# Patient Record
Sex: FEMALE | Race: OTHER | NOT HISPANIC OR LATINO | URBAN - METROPOLITAN AREA
[De-identification: names, ages, dates, MRNs, and addresses within clinical notes are randomized per-mention and may not be internally consistent; named-entity substitution may affect disease eponyms.]

---

## 2020-07-04 ENCOUNTER — EMERGENCY (EMERGENCY)
Facility: HOSPITAL | Age: 30
LOS: 1 days | Discharge: HOME | End: 2020-07-04
Attending: EMERGENCY MEDICINE | Admitting: STUDENT IN AN ORGANIZED HEALTH CARE EDUCATION/TRAINING PROGRAM
Payer: SELF-PAY

## 2020-07-04 VITALS
RESPIRATION RATE: 16 BRPM | DIASTOLIC BLOOD PRESSURE: 90 MMHG | SYSTOLIC BLOOD PRESSURE: 129 MMHG | OXYGEN SATURATION: 100 % | HEART RATE: 93 BPM | TEMPERATURE: 102 F

## 2020-07-04 LAB
ALBUMIN SERPL ELPH-MCNC: 4.6 G/DL — SIGNIFICANT CHANGE UP (ref 3.3–5)
ALP SERPL-CCNC: 110 U/L — SIGNIFICANT CHANGE UP (ref 40–120)
ALT FLD-CCNC: 33 U/L — SIGNIFICANT CHANGE UP (ref 4–33)
ANION GAP SERPL CALC-SCNC: 14 MMO/L — SIGNIFICANT CHANGE UP (ref 7–14)
APPEARANCE UR: SIGNIFICANT CHANGE UP
AST SERPL-CCNC: 27 U/L — SIGNIFICANT CHANGE UP (ref 4–32)
BACTERIA # UR AUTO: HIGH
BASE EXCESS BLDV CALC-SCNC: -1.7 MMOL/L — SIGNIFICANT CHANGE UP
BASOPHILS # BLD AUTO: 0.01 K/UL — SIGNIFICANT CHANGE UP (ref 0–0.2)
BASOPHILS NFR BLD AUTO: 0.1 % — SIGNIFICANT CHANGE UP (ref 0–2)
BILIRUB SERPL-MCNC: 0.7 MG/DL — SIGNIFICANT CHANGE UP (ref 0.2–1.2)
BILIRUB UR-MCNC: NEGATIVE — SIGNIFICANT CHANGE UP
BLOOD GAS VENOUS - CREATININE: 0.57 MG/DL — SIGNIFICANT CHANGE UP (ref 0.5–1.3)
BLOOD GAS VENOUS - FIO2: 21 — SIGNIFICANT CHANGE UP
BLOOD UR QL VISUAL: SIGNIFICANT CHANGE UP
BUN SERPL-MCNC: 4 MG/DL — LOW (ref 7–23)
CALCIUM SERPL-MCNC: 9.7 MG/DL — SIGNIFICANT CHANGE UP (ref 8.4–10.5)
CHLORIDE BLDV-SCNC: 106 MMOL/L — SIGNIFICANT CHANGE UP (ref 96–108)
CHLORIDE SERPL-SCNC: 99 MMOL/L — SIGNIFICANT CHANGE UP (ref 98–107)
CO2 SERPL-SCNC: 23 MMOL/L — SIGNIFICANT CHANGE UP (ref 22–31)
COLOR SPEC: YELLOW — SIGNIFICANT CHANGE UP
CREAT SERPL-MCNC: 0.54 MG/DL — SIGNIFICANT CHANGE UP (ref 0.5–1.3)
EOSINOPHIL # BLD AUTO: 0.01 K/UL — SIGNIFICANT CHANGE UP (ref 0–0.5)
EOSINOPHIL NFR BLD AUTO: 0.1 % — SIGNIFICANT CHANGE UP (ref 0–6)
GAS PNL BLDV: 137 MMOL/L — SIGNIFICANT CHANGE UP (ref 136–146)
GLUCOSE BLDV-MCNC: 104 MG/DL — HIGH (ref 70–99)
GLUCOSE SERPL-MCNC: 115 MG/DL — HIGH (ref 70–99)
GLUCOSE UR-MCNC: NEGATIVE — SIGNIFICANT CHANGE UP
HCG UR-SCNC: NEGATIVE — SIGNIFICANT CHANGE UP
HCO3 BLDV-SCNC: 22 MMOL/L — SIGNIFICANT CHANGE UP (ref 20–27)
HCT VFR BLD CALC: 40.7 % — SIGNIFICANT CHANGE UP (ref 34.5–45)
HCT VFR BLDV CALC: 39.2 % — SIGNIFICANT CHANGE UP (ref 34.5–45)
HGB BLD-MCNC: 13.4 G/DL — SIGNIFICANT CHANGE UP (ref 11.5–15.5)
HGB BLDV-MCNC: 12.7 G/DL — SIGNIFICANT CHANGE UP (ref 11.5–15.5)
HYALINE CASTS # UR AUTO: SIGNIFICANT CHANGE UP
IMM GRANULOCYTES NFR BLD AUTO: 0.3 % — SIGNIFICANT CHANGE UP (ref 0–1.5)
KETONES UR-MCNC: SIGNIFICANT CHANGE UP
LACTATE BLDV-MCNC: 1.3 MMOL/L — SIGNIFICANT CHANGE UP (ref 0.5–2)
LEUKOCYTE ESTERASE UR-ACNC: SIGNIFICANT CHANGE UP
LYMPHOCYTES # BLD AUTO: 1.79 K/UL — SIGNIFICANT CHANGE UP (ref 1–3.3)
LYMPHOCYTES # BLD AUTO: 12.5 % — LOW (ref 13–44)
MCHC RBC-ENTMCNC: 28.2 PG — SIGNIFICANT CHANGE UP (ref 27–34)
MCHC RBC-ENTMCNC: 32.9 % — SIGNIFICANT CHANGE UP (ref 32–36)
MCV RBC AUTO: 85.5 FL — SIGNIFICANT CHANGE UP (ref 80–100)
MONOCYTES # BLD AUTO: 0.84 K/UL — SIGNIFICANT CHANGE UP (ref 0–0.9)
MONOCYTES NFR BLD AUTO: 5.9 % — SIGNIFICANT CHANGE UP (ref 2–14)
NEUTROPHILS # BLD AUTO: 11.62 K/UL — HIGH (ref 1.8–7.4)
NEUTROPHILS NFR BLD AUTO: 81.1 % — HIGH (ref 43–77)
NITRITE UR-MCNC: NEGATIVE — SIGNIFICANT CHANGE UP
NRBC # FLD: 0 K/UL — SIGNIFICANT CHANGE UP (ref 0–0)
PCO2 BLDV: 44 MMHG — SIGNIFICANT CHANGE UP (ref 41–51)
PH BLDV: 7.34 PH — SIGNIFICANT CHANGE UP (ref 7.32–7.43)
PH UR: 7.5 — SIGNIFICANT CHANGE UP (ref 5–8)
PLATELET # BLD AUTO: 379 K/UL — SIGNIFICANT CHANGE UP (ref 150–400)
PMV BLD: 10 FL — SIGNIFICANT CHANGE UP (ref 7–13)
PO2 BLDV: 41 MMHG — HIGH (ref 35–40)
POTASSIUM BLDV-SCNC: 3.3 MMOL/L — LOW (ref 3.4–4.5)
POTASSIUM SERPL-MCNC: 3.6 MMOL/L — SIGNIFICANT CHANGE UP (ref 3.5–5.3)
POTASSIUM SERPL-SCNC: 3.6 MMOL/L — SIGNIFICANT CHANGE UP (ref 3.5–5.3)
PROT SERPL-MCNC: 8.4 G/DL — HIGH (ref 6–8.3)
PROT UR-MCNC: 30 — SIGNIFICANT CHANGE UP
RBC # BLD: 4.76 M/UL — SIGNIFICANT CHANGE UP (ref 3.8–5.2)
RBC # FLD: 13 % — SIGNIFICANT CHANGE UP (ref 10.3–14.5)
RBC CASTS # UR COMP ASSIST: SIGNIFICANT CHANGE UP (ref 0–?)
SAO2 % BLDV: 70.4 % — SIGNIFICANT CHANGE UP (ref 60–85)
SARS-COV-2 RNA SPEC QL NAA+PROBE: SIGNIFICANT CHANGE UP
SODIUM SERPL-SCNC: 136 MMOL/L — SIGNIFICANT CHANGE UP (ref 135–145)
SP GR SPEC: 1.01 — SIGNIFICANT CHANGE UP (ref 1–1.04)
SP GR UR: 1.01 — SIGNIFICANT CHANGE UP (ref 1–1.04)
SQUAMOUS # UR AUTO: SIGNIFICANT CHANGE UP
UROBILINOGEN FLD QL: NORMAL — SIGNIFICANT CHANGE UP
WBC # BLD: 14.32 K/UL — HIGH (ref 3.8–10.5)
WBC # FLD AUTO: 14.32 K/UL — HIGH (ref 3.8–10.5)
WBC UR QL: SIGNIFICANT CHANGE UP (ref 0–?)

## 2020-07-04 PROCEDURE — 99218: CPT

## 2020-07-04 PROCEDURE — 70487 CT MAXILLOFACIAL W/DYE: CPT | Mod: 26

## 2020-07-04 RX ORDER — SODIUM CHLORIDE 9 MG/ML
1000 INJECTION INTRAMUSCULAR; INTRAVENOUS; SUBCUTANEOUS ONCE
Refills: 0 | Status: COMPLETED | OUTPATIENT
Start: 2020-07-04 | End: 2020-07-04

## 2020-07-04 RX ORDER — KETOROLAC TROMETHAMINE 30 MG/ML
30 SYRINGE (ML) INJECTION EVERY 6 HOURS
Refills: 0 | Status: DISCONTINUED | OUTPATIENT
Start: 2020-07-04 | End: 2020-07-05

## 2020-07-04 RX ORDER — ACETAMINOPHEN 500 MG
975 TABLET ORAL ONCE
Refills: 0 | Status: COMPLETED | OUTPATIENT
Start: 2020-07-04 | End: 2020-07-04

## 2020-07-04 RX ORDER — ACETAMINOPHEN 500 MG
650 TABLET ORAL EVERY 6 HOURS
Refills: 0 | Status: DISCONTINUED | OUTPATIENT
Start: 2020-07-04 | End: 2020-07-08

## 2020-07-04 RX ORDER — MORPHINE SULFATE 50 MG/1
4 CAPSULE, EXTENDED RELEASE ORAL ONCE
Refills: 0 | Status: DISCONTINUED | OUTPATIENT
Start: 2020-07-04 | End: 2020-07-04

## 2020-07-04 RX ORDER — AMPICILLIN SODIUM AND SULBACTAM SODIUM 250; 125 MG/ML; MG/ML
3 INJECTION, POWDER, FOR SUSPENSION INTRAMUSCULAR; INTRAVENOUS ONCE
Refills: 0 | Status: COMPLETED | OUTPATIENT
Start: 2020-07-04 | End: 2020-07-04

## 2020-07-04 RX ORDER — AMPICILLIN SODIUM AND SULBACTAM SODIUM 250; 125 MG/ML; MG/ML
3 INJECTION, POWDER, FOR SUSPENSION INTRAMUSCULAR; INTRAVENOUS EVERY 6 HOURS
Refills: 0 | Status: DISCONTINUED | OUTPATIENT
Start: 2020-07-04 | End: 2020-07-08

## 2020-07-04 RX ADMIN — SODIUM CHLORIDE 1000 MILLILITER(S): 9 INJECTION INTRAMUSCULAR; INTRAVENOUS; SUBCUTANEOUS at 18:44

## 2020-07-04 RX ADMIN — MORPHINE SULFATE 4 MILLIGRAM(S): 50 CAPSULE, EXTENDED RELEASE ORAL at 15:00

## 2020-07-04 RX ADMIN — Medication 975 MILLIGRAM(S): at 14:57

## 2020-07-04 RX ADMIN — SODIUM CHLORIDE 1000 MILLILITER(S): 9 INJECTION INTRAMUSCULAR; INTRAVENOUS; SUBCUTANEOUS at 15:15

## 2020-07-04 RX ADMIN — AMPICILLIN SODIUM AND SULBACTAM SODIUM 200 GRAM(S): 250; 125 INJECTION, POWDER, FOR SUSPENSION INTRAMUSCULAR; INTRAVENOUS at 15:01

## 2020-07-04 RX ADMIN — SODIUM CHLORIDE 1000 MILLILITER(S): 9 INJECTION INTRAMUSCULAR; INTRAVENOUS; SUBCUTANEOUS at 14:57

## 2020-07-04 RX ADMIN — AMPICILLIN SODIUM AND SULBACTAM SODIUM 200 GRAM(S): 250; 125 INJECTION, POWDER, FOR SUSPENSION INTRAMUSCULAR; INTRAVENOUS at 21:24

## 2020-07-04 RX ADMIN — AMPICILLIN SODIUM AND SULBACTAM SODIUM 3 GRAM(S): 250; 125 INJECTION, POWDER, FOR SUSPENSION INTRAMUSCULAR; INTRAVENOUS at 15:31

## 2020-07-04 RX ADMIN — Medication 30 MILLIGRAM(S): at 21:20

## 2020-07-04 NOTE — ED ADULT TRIAGE NOTE - CHIEF COMPLAINT QUOTE
Friend states she had a root canal 4 years ago and 2 days ago developed pain in same area. has been taking Motrin Q4hr. c.o. pain 10/10. Developed swelling to lt side of face last night. +swelling noted from Lt eye to chin. +fever at triage. denies difficulty breathing and swallowing.

## 2020-07-04 NOTE — ED PROVIDER NOTE - CLINICAL SUMMARY MEDICAL DECISION MAKING FREE TEXT BOX
30 y.o female with no PMhx presented to the ED complaining of having dental pain worsening over the past 2 days. Dental pain likely dental abscess-labs, fluids, medication, antibiotics, ct facial reassess

## 2020-07-04 NOTE — PROGRESS NOTE ADULT - SUBJECTIVE AND OBJECTIVE BOX
CC: 30 year old Sinhala-speaking female presents to ED with left-sided facial swelling.     HPI: 27 year old female presents to ED with dental pain and left-sided facial swelling for two days. Pain has worsened since onset. Pt states mild relief with ibuprofen and Tylenol. Pt reports associated fever and blurred vision in left eye. Denies difficulty breathing, swallowing, nausea, vomiting, diarrhea, abdominal pain, CP, JULIO, headaches, dizziness.     Med HX: Dental abscess  FACIAL SWELLING  RAD CDS    Allergies: No Known Allergies    RX: ampicillin/sulbactam  IVPB 3 Gram(s) IV Intermittent every 6 hours    EOE: (+) left-sided facial swelling, lower left eyelid swelling, obliteration of left nasolabial fold    TMJ (WNL)  Trismus (-)  LAD (-)  Dysphagia (-)    IOE:   Multiple carious broken down permanent teeth (+) swelling. (+) palpation, significant lip swelling  Buccal Mucosa: Vestibular edema from left upper central to left upper canine   Hard/Soft palate (WNL)  Tongue/Floor of Mouth (WNL)    Radiographs: PAN  1) Periapical lesion associated with maxillary left canine  2) Multiple broken down teeth      Additional Radiograph: CT ordered by ED prior to dental on call arrival.    1) Dental disease, multiple regions in foci of periapical lucency surrounding   the teeth in the maxillary alveolus and mandible.     Left periapical 5 x 7 x 4 mm, AP, TR, CC lucency along left maxillary canine   #11 root, thinning buccal bony cortical margin, with subjacent 1.4 x 0.6 x   1.7 cm AP, TR, CC subperiosteal rim-enhancing collection along the left   maxillary alveolus concerning for subperiosteal abscess. Loss of adjacent   soft tissue planes with inflammatory change and stranding of the fat in the   left premaxillary soft tissues and buccal fat pad concerning for left   maxillary cellulitis extending to left premaxillary soft tissues, left   parotid Stensen's duct, left parotid and  spaces, left nasal ala   and left inferior orbital rim with left preseptal periorbital soft tissue   swelling, left platysma which demonstrates thickening and enlargement with   stranding the fat planes extending inferiorly into the left submandibular   region. The subcutaneous soft tissue swelling also extends into the left   premaxillary soft tissues to the left nasal ala and upper lip with loss of   normal intervening fat planes. Marked periapical lucencies noted along the   right maxillary incisor #8. There is dehiscence of the bony margin overlying   the right canine (series 3 image 110).     There is preseptal periorbital infectious and inflammatory change, there is   extension of the posterior molar roots to the floor of the left maxillary   antra with mucosal thickening in the left maxillary sinus, (2:136,   odontogenic sinusitis also not excluded.     Extensive dental disease along the mandible with multiple dental caries and   lucency. Orbital globes are intact. The mandibular condyles are located   without fracture. The temporomandibular joints are intact.     There is enlargement of the nasopharyngeal adenoidal soft tissues and   palatine tonsils likely reactive with multiple small lymph nodes in the   upper cervical chains. Intracranially, there is no acute hemorrhage or   midline shift.     IMPRESSION:     Dental disease with periapical lucency, in mandible and maxilla, including   left maxillary tooth #11 with a subjacent 1.4 x 0.6 x 1.7 cm AP, TR, CC left   subperiosteal abscess with left premaxillary soft tissue swelling and   cellulitis and left maxillary, parotid, submandibular, preseptal and   periorbital regions,, with additional areas of dental disease as detailed   above.      Assessment: Gross caries tooth #11  with associated swelling.      Treatment: Discussed radiographic and clinical findings with patient using the Mong[place]ian- ([id number]). Discussed RBA of recommended treatment. Informed patient of multiple nonrestorable and carious teeth that need to be addressed by outside dentist. Pt understands that she has more than 1 source of pain in her mouth. Obtained verbal consent. Administered 1 carpule of lidocaine 2% 1:100k, 2 carpules 4% septocaine 1:100k epi via local infiltration with changing of needles after each administration. Incised to bone, dissected fascial planes, hemopurulence appreciated. Culture taken.  Irrigated copiously with sterline saline. Penrose drain placed with 1 3-0 silk suture. Hemostasis achieved. POIG. All questions answered.    Recommendations:   1. Per ED, overnight stay on IV Unaysen   2. F/U with outside dentist for comprehensive care  3. F/U in 2-3 business days for drain removal with either outpatient dentist or [facility] dental clinic [phone].  4. If any difficulty breathing/swallowing or fever and swelling occur, return to ED.    Albino Sanchez, DMD #[id number] CC: 30 year old Urdu-speaking female presents to ED with left-sided facial swelling.     HPI: 27 year old female presents to ED with dental pain and left-sided facial swelling for two days. Pain has worsened since onset. Pt states mild relief with ibuprofen and Tylenol. Pt reports associated fever and blurred vision in left eye. Denies difficulty breathing, swallowing, nausea, vomiting, diarrhea, abdominal pain, CP, JULIO, headaches, dizziness.     Med HX: Dental abscess  FACIAL SWELLING  RAD CDS    Allergies: No Known Allergies    RX: ampicillin/sulbactam  IVPB 3 Gram(s) IV Intermittent every 6 hours    EOE: (+) left-sided facial swelling, lower left eyelid swelling, obliteration of left nasolabial fold    TMJ (WNL)  Trismus (-)  LAD (-)  Dysphagia (-)    IOE:   Multiple carious broken down permanent teeth (+) swelling. (+) palpation, significant lip swelling  Buccal Mucosa: Vestibular edema from left upper central to left upper canine   Hard/Soft palate (WNL)  Tongue/Floor of Mouth (WNL)    Radiographs: PAN  1) Periapical lesion associated with maxillary left canine  2) Multiple broken down teeth      Additional Radiograph: CT ordered by ED prior to dental on call arrival.    1) Dental disease, multiple regions in foci of periapical lucency surrounding   the teeth in the maxillary alveolus and mandible.     Left periapical 5 x 7 x 4 mm, AP, TR, CC lucency along left maxillary canine   #11 root, thinning buccal bony cortical margin, with subjacent 1.4 x 0.6 x   1.7 cm AP, TR, CC subperiosteal rim-enhancing collection along the left   maxillary alveolus concerning for subperiosteal abscess. Loss of adjacent   soft tissue planes with inflammatory change and stranding of the fat in the   left premaxillary soft tissues and buccal fat pad concerning for left   maxillary cellulitis extending to left premaxillary soft tissues, left   parotid Stensen's duct, left parotid and  spaces, left nasal ala   and left inferior orbital rim with left preseptal periorbital soft tissue   swelling, left platysma which demonstrates thickening and enlargement with   stranding the fat planes extending inferiorly into the left submandibular   region. The subcutaneous soft tissue swelling also extends into the left   premaxillary soft tissues to the left nasal ala and upper lip with loss of   normal intervening fat planes. Marked periapical lucencies noted along the   right maxillary incisor #8. There is dehiscence of the bony margin overlying   the right canine (series 3 image 110).     There is preseptal periorbital infectious and inflammatory change, there is   extension of the posterior molar roots to the floor of the left maxillary   antra with mucosal thickening in the left maxillary sinus, (2:136,   odontogenic sinusitis also not excluded.     Extensive dental disease along the mandible with multiple dental caries and   lucency. Orbital globes are intact. The mandibular condyles are located   without fracture. The temporomandibular joints are intact.     There is enlargement of the nasopharyngeal adenoidal soft tissues and   palatine tonsils likely reactive with multiple small lymph nodes in the   upper cervical chains. Intracranially, there is no acute hemorrhage or   midline shift.     IMPRESSION:     Dental disease with periapical lucency, in mandible and maxilla, including   left maxillary tooth #11 with a subjacent 1.4 x 0.6 x 1.7 cm AP, TR, CC left   subperiosteal abscess with left premaxillary soft tissue swelling and   cellulitis and left maxillary, parotid, submandibular, preseptal and   periorbital regions,, with additional areas of dental disease as detailed   above.      Assessment: Gross caries tooth #11  with associated swelling.      Treatment: Discussed radiographic and clinical findings with patient using the [place]z- ([id number]). Discussed RBA of recommended treatment. Informed patient of multiple nonrestorable and carious teeth that need to be addressed by outside dentist. Pt understands that she has more than 1 source of pain in her mouth. Obtained verbal consent. Administered 1 carpule of lidocaine 2% 1:100k, 2 carpules 4% septocaine 1:100k epi via local infiltration with changing of needles after each administration. Incised to bone, dissected fascial planes, hemopurulence appreciated. Culture taken.  Irrigated copiously with sterline saline. Penrose drain placed with 1 3-0 silk suture. Hemostasis achieved. POIG. All questions answered.    Recommendations:   1. Per ED, overnight stay on IV Unaysen   2. F/U with outside dentist for comprehensive care  3. F/U in 2-3 business days for drain removal with either outpatient dentist or [facility] dental clinic [phone].  4. If any difficulty breathing/swallowing or fever and swelling occur, return to ED.    Bekah Villagomez DDS #[id number]  Martha Gray DDS #[id number]  Elma Cm DDS #[id number]

## 2020-07-04 NOTE — ED PROVIDER NOTE - TOOTH FINDINGS
PERCUSSION TENDERNESS/tenderness and swelling to left frontal tooth, swelling and tenderness to left side of face concerning for abscess./DENTAL CARIES

## 2020-07-04 NOTE — ED CDU PROVIDER INITIAL DAY NOTE - PHYSICAL EXAMINATION
CONSTITUTIONAL:  Well appearing, awake, alert, oriented to person, place, time/situation and in no apparent distress.  Pt. is objectively comfortable appearing and verbalizing in full, clear, effortless sentences.  ENMT: Left cheek with generalized mild soft tissue swelling / mild erythema. Airway patent.  Nasal mucosa clear.  Moist mucous membranes.  +Dental caries; pt s/p I&D with Penrose drain in place.  Neck supple.  EYES:  Clear OU.  CARDIAC:  Normal rate, regular rhythm.  Heart sounds S1 S2.  No murmurs, gallops, or rubs.  RESPIRATORY:  Breath sounds clear and equal bilaterally.  No wheezes, no rales, no rhonchi.  GASTROINTESTINAL:  Abdomen soft, non-distended, non-tender.  No rebound, no guarding.  MUSCULOSKELETAL:  Range of motion is not limited.  Gait stable.    SKIN:  Skin color unremarkable.  Skin warm, dry, and intact.    PSYCHIATRIC:  Alert and oriented to person/place/time/situation.  Mood and affect WNL.  No apparent risk to self or others.

## 2020-07-04 NOTE — ED ADULT NURSE NOTE - OBJECTIVE STATEMENT
patient  Alert & ox3. patient complains of  swelling to lt side of face last night. +swelling noted from Lt eye to chin. +fever at triage. pt . evaluated by MD. Placed 20g angiocath Lt. AC., labs drawn and sent. IVF NS started as per order. patient will be waiting for results, further evaluation and disposition.  made comfortable.will continue to monitor.

## 2020-07-04 NOTE — ED CDU PROVIDER INITIAL DAY NOTE - INDICATION FOR OBSERVATION
Other/IV antibiotics, supportive care / pain control, general observation care / monitoring; AM labs, Dental team following pt./Therapeutic Intensity

## 2020-07-04 NOTE — ED PROVIDER NOTE - OBJECTIVE STATEMENT
30 y.o female with no PMhx presented to the ED complaining of having dental pain worsening over the past 2 days. Pt states that 2 days prior she woke up and started to have swelling and pain around her tooth. Pt states that since then the pain has gotten persistently worse. pt states that she attempted to take ibuprofen and tylenol however did not have much relief. Pt admits to having associated fever as well. Pt denies having any nausea, vomiting, diarrhea, abdominal pain, CP, JULIO, headaches, dizziness. Pt states that she had a root canal 4 years ago. Pt states that she has been able to drink fluids however unable to eat solids.

## 2020-07-04 NOTE — ED CDU PROVIDER INITIAL DAY NOTE - OBJECTIVE STATEMENT
30 y.o female with no PMhx presented to the ED complaining of having dental pain worsening over the past 2 days. Pt states that 2 days prior she woke up and started to have swelling and pain around her tooth. Pt states that since then the pain has gotten persistently worse. pt states that she attempted to take ibuprofen and tylenol however did not have much relief. Pt admits to having associated fever as well. Pt denies having any nausea, vomiting, diarrhea, abdominal pain, CP, JULIO, headaches, dizziness. Pt states that she had a root canal 4 years ago. Pt states that she has been able to drink fluids however unable to eat solids.    CDU IS[facility] Alexander Note------  31 yo female, no stated PMH, presented to the ED for left facial swelling (cheek/jaw) and dental pain to the left side.  In the ED, WBC 14.32, lactate 1.3, CT maxillofacial performed: "....Dental disease, multiple regions in foci of periapical lucency surrounding the teeth in the maxillary alveolus and mandible.  Left periapical 5 x 7 x 4 mm, AP, TR, CC lucency along left maxillary canine #11 root, thinning  buccal bony cortical margin, with  subjacent 1.4 x 0.6 x 1.7 cm AP, TR, CC subperiosteal rim-enhancing collection along the left maxillary alveolus concerning for subperiosteal abscess. Loss of adjacent soft tissue planes with inflammatory change and stranding of the fat in the left premaxillary soft tissues and buccal fat pad concerning for  left maxillary cellulitis extending to left premaxillary soft tissues,  left parotid Stensen's duct, left parotid and  spaces, left nasal ala and left inferior orbital rim with left preseptal periorbital soft tissue swelling,  left platysma which demonstrates thickening and enlargement with stranding the fat planes extending inferiorly into the left submandibular region. The subcutaneous soft tissue swelling also extends into the left premaxillary soft tissues to the left nasal ala and upper lip with loss of normal intervening fat planes. Marked periapical lucencies noted along the right maxillary incisor #8. There is dehiscence of the bony margin overlying the right canine (series 3 image 110).  There is preseptal periorbital infectious and inflammatory change, there is extension of the posterior molar roots to the floor of the left maxillary antra with mucosal thickening in the left maxillary sinus, (2:136, odontogenic sinusitis also not excluded.  Extensive dental disease along the mandible with multiple  dental caries and lucency. Orbital globes are intact. The mandibular condyles are located without fracture. The temporomandibular joints are intact.   There is enlargement of the nasopharyngeal adenoidal soft tissues and palatine tonsils likely reactive with multiple small lymph nodes in the upper cervical chains. Intracranially, there is no acute hemorrhage or midline shift.".  Dental team was consulted; drainage of dental abscess was performed by team with Penrose drain placed.  Pt was given IV Unasyn in the ED; Dental advised CDU stay for continued IV Unasyn treatment.  Pt. was dispo'd to CDU for plan:  IV antibiotics, supportive care / pain control, general observation care / monitoring; AM labs, Dental team following pt.

## 2020-07-04 NOTE — ED CDU PROVIDER INITIAL DAY NOTE - ATTENDING CONTRIBUTION TO CARE
IV antibiotics, supportive care / pain control, general observation care / monitoring; AM labs, Dental team following pt.

## 2020-07-05 LAB
ANION GAP SERPL CALC-SCNC: 14 MMO/L — SIGNIFICANT CHANGE UP (ref 7–14)
BASOPHILS # BLD AUTO: 0.03 K/UL — SIGNIFICANT CHANGE UP (ref 0–0.2)
BASOPHILS NFR BLD AUTO: 0.2 % — SIGNIFICANT CHANGE UP (ref 0–2)
BUN SERPL-MCNC: 4 MG/DL — LOW (ref 7–23)
CALCIUM SERPL-MCNC: 9 MG/DL — SIGNIFICANT CHANGE UP (ref 8.4–10.5)
CHLORIDE SERPL-SCNC: 104 MMOL/L — SIGNIFICANT CHANGE UP (ref 98–107)
CO2 SERPL-SCNC: 22 MMOL/L — SIGNIFICANT CHANGE UP (ref 22–31)
CREAT SERPL-MCNC: 0.53 MG/DL — SIGNIFICANT CHANGE UP (ref 0.5–1.3)
CULTURE RESULTS: SIGNIFICANT CHANGE UP
EOSINOPHIL # BLD AUTO: 0.09 K/UL — SIGNIFICANT CHANGE UP (ref 0–0.5)
EOSINOPHIL NFR BLD AUTO: 0.7 % — SIGNIFICANT CHANGE UP (ref 0–6)
GLUCOSE SERPL-MCNC: 78 MG/DL — SIGNIFICANT CHANGE UP (ref 70–99)
HBA1C BLD-MCNC: 5.2 % — SIGNIFICANT CHANGE UP (ref 4–5.6)
HCT VFR BLD CALC: 35.4 % — SIGNIFICANT CHANGE UP (ref 34.5–45)
HGB BLD-MCNC: 11.8 G/DL — SIGNIFICANT CHANGE UP (ref 11.5–15.5)
IMM GRANULOCYTES NFR BLD AUTO: 0.4 % — SIGNIFICANT CHANGE UP (ref 0–1.5)
LYMPHOCYTES # BLD AUTO: 2.97 K/UL — SIGNIFICANT CHANGE UP (ref 1–3.3)
LYMPHOCYTES # BLD AUTO: 24.4 % — SIGNIFICANT CHANGE UP (ref 13–44)
MCHC RBC-ENTMCNC: 28.9 PG — SIGNIFICANT CHANGE UP (ref 27–34)
MCHC RBC-ENTMCNC: 33.3 % — SIGNIFICANT CHANGE UP (ref 32–36)
MCV RBC AUTO: 86.8 FL — SIGNIFICANT CHANGE UP (ref 80–100)
MONOCYTES # BLD AUTO: 0.94 K/UL — HIGH (ref 0–0.9)
MONOCYTES NFR BLD AUTO: 7.7 % — SIGNIFICANT CHANGE UP (ref 2–14)
NEUTROPHILS # BLD AUTO: 8.07 K/UL — HIGH (ref 1.8–7.4)
NEUTROPHILS NFR BLD AUTO: 66.6 % — SIGNIFICANT CHANGE UP (ref 43–77)
NRBC # FLD: 0 K/UL — SIGNIFICANT CHANGE UP (ref 0–0)
PLATELET # BLD AUTO: 340 K/UL — SIGNIFICANT CHANGE UP (ref 150–400)
PMV BLD: 10.9 FL — SIGNIFICANT CHANGE UP (ref 7–13)
POTASSIUM SERPL-MCNC: 3.4 MMOL/L — LOW (ref 3.5–5.3)
POTASSIUM SERPL-SCNC: 3.4 MMOL/L — LOW (ref 3.5–5.3)
RBC # BLD: 4.08 M/UL — SIGNIFICANT CHANGE UP (ref 3.8–5.2)
RBC # FLD: 13.3 % — SIGNIFICANT CHANGE UP (ref 10.3–14.5)
SODIUM SERPL-SCNC: 140 MMOL/L — SIGNIFICANT CHANGE UP (ref 135–145)
SPECIMEN SOURCE: SIGNIFICANT CHANGE UP
WBC # BLD: 12.15 K/UL — HIGH (ref 3.8–10.5)
WBC # FLD AUTO: 12.15 K/UL — HIGH (ref 3.8–10.5)

## 2020-07-05 PROCEDURE — 99224: CPT

## 2020-07-05 RX ADMIN — AMPICILLIN SODIUM AND SULBACTAM SODIUM 200 GRAM(S): 250; 125 INJECTION, POWDER, FOR SUSPENSION INTRAMUSCULAR; INTRAVENOUS at 21:46

## 2020-07-05 RX ADMIN — AMPICILLIN SODIUM AND SULBACTAM SODIUM 200 GRAM(S): 250; 125 INJECTION, POWDER, FOR SUSPENSION INTRAMUSCULAR; INTRAVENOUS at 15:22

## 2020-07-05 RX ADMIN — Medication 30 MILLIGRAM(S): at 10:16

## 2020-07-05 RX ADMIN — Medication 650 MILLIGRAM(S): at 17:49

## 2020-07-05 RX ADMIN — AMPICILLIN SODIUM AND SULBACTAM SODIUM 200 GRAM(S): 250; 125 INJECTION, POWDER, FOR SUSPENSION INTRAMUSCULAR; INTRAVENOUS at 03:22

## 2020-07-05 RX ADMIN — Medication 650 MILLIGRAM(S): at 01:23

## 2020-07-05 RX ADMIN — AMPICILLIN SODIUM AND SULBACTAM SODIUM 200 GRAM(S): 250; 125 INJECTION, POWDER, FOR SUSPENSION INTRAMUSCULAR; INTRAVENOUS at 09:16

## 2020-07-05 NOTE — PROGRESS NOTE ADULT - SUBJECTIVE AND OBJECTIVE BOX
Patient is a 30y old  Female who presents with a chief complaint of left side facial swelling (2020 11:17)    HPI:  Patient had I&D on 2020 following admission to the ED. CT ordered by ED prior to dental on call arrival.  Purulence was appreciated upon I&D. Culture was taken.   Patient continues to improve following I/D procedure.     PAST MEDICAL & SURGICAL HISTORY:  No pertinent past medical history  No significant past surgical history      MEDICATIONS  (STANDING):  ampicillin/sulbactam  IVPB 3 Gram(s) IV Intermittent every 6 hours    MEDICATIONS  (PRN):  acetaminophen   Tablet .. 650 milliGRAM(s) Oral every 6 hours PRN Temp greater or equal to 38C (100.4F)  ketorolac   Injectable 30 milliGRAM(s) IV Push every 6 hours PRN mild to moderate pain      Allergies    No Known Allergies    Intolerances        FAMILY HISTORY:  No pertinent family history in first degree relatives      *SOCIAL HISTORY: (guardian or who pt came with), (smoking hx)    Vital Signs Last 24 Hrs  T(C): 37.2 (2020 14:05), Max: 38.3 (2020 21:05)  T(F): 99 (2020 14:05), Max: 101 (2020 21:05)  HR: 97 (2020 14:05) (84 - 103)  BP: 105/71 (2020 14:05) (104/59 - 122/76)  BP(mean): --  RR: 16 (2020 14:05) (16 - 17)  SpO2: 100% (2020 14:05) (100% - 100%)    EOE:  TMJ ( -  ) clicks                    ( -   ) pops                    (  - ) crepitus             Mandible FROM             Facial bones and MOM: grossly intact             ( -  ) trismus             ( -  ) LAD             ( + ) swelling: UL facial swelling, improved since this morning 20. Naso-labial fold now visible.              ( +  ) asymmetry             (  - ) palpation             ( -  ) SOB             ( -  ) dysphagia             ( -  ) LOC    Left eyelid opening improved since 7/5 AM, pt states that she has no trouble with vision. No problems with breathing or swallowing. Pt was able to open mouth more than this morning.     IOE:   secondary dentition, multiple grossly decayed teeth, multiple missing teeth           hard/soft palate:  ( -  ) palatal torus           tongue/FOM WNL           labial/buccal mucosa - edematous, healing mucosa at site of I&D           ( -  ) percussion           ( +  ) palpation - mild sensitivity upon palpation previous ID site #10/11           (  + ) swelling - mild intraoral swelling associated with I&D site as expected    Penrose drain is still intact and in place. Moved drain further into incision with instrument.     LABS:                        11.8   12.15 )-----------( 340      ( 2020 06:05 )             35.4         140  |  104  |  4<L>  ----------------------------<  78  3.4<L>   |  22  |  0.53    Ca    9.0      2020 06:05    TPro  8.4<H>  /  Alb  4.6  /  TBili  0.7  /  DBili  x   /  AST  27  /  ALT  33  /  AlkPhos  110      WBC Count: 12.15 K/uL <H> [3.8 - 10.5] ( @ 06:05)  Platelet Count - Automated: 340 K/uL [150 - 400] ( @ 06:05)  WBC Count: 14.32 K/uL <H> [3.8 - 10.5] ( @ 14:50)  Platelet Count - Automated: 379 K/uL [150 - 400] ( @ 14:50)  Culture Results:   <10,000 CFU/mL Normal Urogenital Lynn ( @ 14:15)    Urinalysis Basic - ( 2020 14:52 )    Color: YELLOW / Appearance: Lt TURBID / S.010 / pH: 7.5  Gluc: NEGATIVE / Ketone: SMALL  / Bili: NEGATIVE / Urobili: NORMAL   Blood: TRACE / Protein: 30 / Nitrite: NEGATIVE   Leuk Esterase: LARGE / RBC: 0-2 / WBC 30-50   Sq Epi: MODERATE / Non Sq Epi: x / Bacteria: MANY    ASSESSMENT: Swelling improved since this morning. Pt able to open up left eye more and is not in any pain currently.   Pt is healing wnl.    PROCEDURE: IOE, EOE, Clinical exam performed. Moved drain further into incision site. Answered all questions via .     RECOMMENDATIONS:  1) F/U with [facility] to remove drain tomorrow 2020. Pt given phone number to clinic to schedule appt to have drain removed during f/u.   2) Antibiotics per ED in hospital, and recommend PO antibiotics upon discharge, otc pain medication.   3) Dental F/U with outpatient dentist for comprehensive dental care.   4) If any difficulty swallowing/breathing, fever occur, page dental.     Vikash Hernandez, QUIA[facility] #[id number]  Vladislav Cortez DDS #[id number]  Shahram Mckeon, SANDRA #[id number] Patient is a 30y old  Female who presents with a chief complaint of left side facial swelling (2020 11:17)    HPI:  Patient had I&D on 2020 following admission to the ED. CT ordered by ED prior to dental on call arrival.  Purulence was appreciated upon I&D. Culture was taken.   Patient continues to improve following I/D procedure.     PAST MEDICAL & SURGICAL HISTORY:  No pertinent past medical history  No significant past surgical history      MEDICATIONS  (STANDING):  ampicillin/sulbactam  IVPB 3 Gram(s) IV Intermittent every 6 hours    MEDICATIONS  (PRN):  acetaminophen   Tablet .. 650 milliGRAM(s) Oral every 6 hours PRN Temp greater or equal to 38C (100.4F)  ketorolac   Injectable 30 milliGRAM(s) IV Push every 6 hours PRN mild to moderate pain      Allergies    No Known Allergies    Intolerances        FAMILY HISTORY:  No pertinent family history in first degree relatives      *SOCIAL HISTORY: (guardian or who pt came with), (smoking hx)    Vital Signs Last 24 Hrs  T(C): 37.2 (2020 14:05), Max: 38.3 (2020 21:05)  T(F): 99 (2020 14:05), Max: 101 (2020 21:05)  HR: 97 (2020 14:05) (84 - 103)  BP: 105/71 (2020 14:05) (104/59 - 122/76)  BP(mean): --  RR: 16 (2020 14:05) (16 - 17)  SpO2: 100% (2020 14:05) (100% - 100%)    EOE:  TMJ ( -  ) clicks                    ( -   ) pops                    (  - ) crepitus             Mandible FROM             Facial bones and MOM: grossly intact             ( -  ) trismus             ( -  ) LAD             ( + ) swelling: UL facial swelling, improved since this morning 20. Naso-labial fold now visible.              ( +  ) asymmetry             (  - ) palpation             ( -  ) SOB             ( -  ) dysphagia             ( -  ) LOC    Left eyelid opening improved since 7/5 AM, pt states that she has no trouble with vision. No problems with breathing or swallowing. Pt was able to open mouth more than this morning.     IOE:   secondary dentition, multiple grossly decayed teeth, multiple missing teeth           hard/soft palate:  ( -  ) palatal torus           tongue/FOM WNL           labial/buccal mucosa - edematous, healing mucosa at site of I&D           ( -  ) percussion           ( +  ) palpation - mild sensitivity upon palpation previous ID site #10/11           (  + ) swelling - mild intraoral swelling associated with I&D site as expected    Penrose drain is still intact and in place. Moved drain further into incision with instrument.     LABS:                        11.8   12.15 )-----------( 340      ( 2020 06:05 )             35.4         140  |  104  |  4<L>  ----------------------------<  78  3.4<L>   |  22  |  0.53    Ca    9.0      2020 06:05    TPro  8.4<H>  /  Alb  4.6  /  TBili  0.7  /  DBili  x   /  AST  27  /  ALT  33  /  AlkPhos  110      WBC Count: 12.15 K/uL <H> [3.8 - 10.5] ( @ 06:05)  Platelet Count - Automated: 340 K/uL [150 - 400] ( @ 06:05)  WBC Count: 14.32 K/uL <H> [3.8 - 10.5] ( @ 14:50)  Platelet Count - Automated: 379 K/uL [150 - 400] ( @ 14:50)  Culture Results:   <10,000 CFU/mL Normal Urogenital Lynn ( @ 14:15)    Urinalysis Basic - ( 2020 14:52 )    Color: YELLOW / Appearance: Lt TURBID / S.010 / pH: 7.5  Gluc: NEGATIVE / Ketone: SMALL  / Bili: NEGATIVE / Urobili: NORMAL   Blood: TRACE / Protein: 30 / Nitrite: NEGATIVE   Leuk Esterase: LARGE / RBC: 0-2 / WBC 30-50   Sq Epi: MODERATE / Non Sq Epi: x / Bacteria: MANY    ASSESSMENT: Swelling improved since this morning. Pt able to open up left eye more and is not in any pain currently.   Pt is healing wnl.    PROCEDURE: IOE, EOE, Clinical exam performed. Moved drain further into incision site. Answered all questions and instructions given via  ( ID #[id number]).     RECOMMENDATIONS:  1) F/U with [facility] to remove drain tomorrow 2020. Pt given phone number to clinic to schedule appt to have drain removed during f/u.   2) Antibiotics per ED in hospital, and recommend PO antibiotics upon discharge, otc pain medication.   3) Dental F/U with outpatient dentist for comprehensive dental care.   4) If any difficulty swallowing/breathing, fever occur, page dental.     Vikash Hernandez, [facility] #[id number]  Vladislav Cortez DDS #[id number]  Shahram Mckeon, SANDRA #[id number]

## 2020-07-05 NOTE — ED CDU PROVIDER SUBSEQUENT DAY NOTE - CONSTITUTIONAL DEVELOPMENT, MLM
Call wife back, your pointers you gave them for his Cochlear implant are not working. [place] would like an appointment this week with you as the are in town.   well developed

## 2020-07-05 NOTE — PROGRESS NOTE ADULT - SUBJECTIVE AND OBJECTIVE BOX
Patient is a 30y old  Female who presents with a chief complaint of left side facial swelling    HPI:  Patient had I&D on 2020 following admission to the ED. CT ordered by ED prior to dental on call arrival.  Purulence was appreciated upon I&D. Culture was taken.   1) Dental disease, multiple regions in foci of periapical lucency surrounding   the teeth in the maxillary alveolus and mandible.     Left periapical 5 x 7 x 4 mm, AP, TR, CC lucency along left maxillary canine   #11 root, thinning buccal bony cortical margin, with subjacent 1.4 x 0.6 x   1.7 cm AP, TR, CC subperiosteal rim-enhancing collection along the left   maxillary alveolus concerning for subperiosteal abscess. Loss of adjacent   soft tissue planes with inflammatory change and stranding of the fat in the   left premaxillary soft tissues and buccal fat pad concerning for left   maxillary cellulitis extending to left premaxillary soft tissues, left   parotid Stensen's duct, left parotid and  spaces, left nasal ala   and left inferior orbital rim with left preseptal periorbital soft tissue   swelling, left platysma which demonstrates thickening and enlargement with   stranding the fat planes extending inferiorly into the left submandibular   region. The subcutaneous soft tissue swelling also extends into the left   premaxillary soft tissues to the left nasal ala and upper lip with loss of   normal intervening fat planes. Marked periapical lucencies noted along the   right maxillary incisor #8. There is dehiscence of the bony margin overlying   the right canine (series 3 image 110).     There is preseptal periorbital infectious and inflammatory change, there is   extension of the posterior molar roots to the floor of the left maxillary   antra with mucosal thickening in the left maxillary sinus, (2:136,   odontogenic sinusitis also not excluded.     Extensive dental disease along the mandible with multiple dental caries and   lucency. Orbital globes are intact. The mandibular condyles are located   without fracture. The temporomandibular joints are intact.     There is enlargement of the nasopharyngeal adenoidal soft tissues and   palatine tonsils likely reactive with multiple small lymph nodes in the   upper cervical chains. Intracranially, there is no acute hemorrhage or   midline shift.     IMPRESSION:     Dental disease with periapical lucency, in mandible and maxilla, including   left maxillary tooth #11 with a subjacent 1.4 x 0.6 x 1.7 cm AP, TR, CC left   subperiosteal abscess with left premaxillary soft tissue swelling and   cellulitis and left maxillary, parotid, submandibular, preseptal and   periorbital regions,, with additional areas of dental disease as detailed   above.      PAST MEDICAL & SURGICAL HISTORY:  No pertinent past medical history  No significant past surgical history      MEDICATIONS  (STANDING):  ampicillin/sulbactam  IVPB 3 Gram(s) IV Intermittent every 6 hours    MEDICATIONS  (PRN):  acetaminophen   Tablet .. 650 milliGRAM(s) Oral every 6 hours PRN Temp greater or equal to 38C (100.4F)  ketorolac   Injectable 30 milliGRAM(s) IV Push every 6 hours PRN mild to moderate pain      Allergies    No Known Allergies    Intolerances        FAMILY HISTORY:  No pertinent family history in first degree relatives      *SOCIAL HISTORY: (guardian or who pt came with), (smoking hx)    *Last Dental Visit:    Vital Signs Last 24 Hrs  T(C): 37.5 (2020 10:07), Max: 39.1 (2020 13:29)  T(F): 99.5 (2020 10:07), Max: 102.4 (2020 13:29)  HR: 103 (2020 10:07) (84 - 103)  BP: 116/76 (2020 10:07) (104/59 - 129/90)  BP(mean): --  RR: 16 (2020 10:07) (16 - 17)  SpO2: 100% (2020 10:07) (100% - 100%)    EOE:  TMJ ( - ) clicks                    ( -  ) pops                    (  - ) crepitus             Mandible FROM             Facial bones and MOM grossly intact             ( -  ) trismus             ( -  ) LAD             ( + ) swelling - improving since yesterday, UL facial swelling             ( +  ) asymmetry             (  - ) palpation             (  - ) SOB             (  - ) dysphagia             (  - ) LOC    IOE:  secondary dentition, multiple grossly decayed teeth, multiple missing teeth           hard/soft palate:  ( -  ) palatal torus           tongue/FOM WNL           labial/buccal mucosa - edematous, healing mucosa at site of I&D           ( -  ) percussion           ( +  ) palpation - mild sensitivity upon palpation           (  + ) swelling - mild intraoral swelling associated with I&D site as expected      LABS:                        11.8   12.15 )-----------( 340      ( 2020 06:05 )             35.4     07-    140  |  104  |  4<L>  ----------------------------<  78  3.4<L>   |  22  |  0.53    Ca    9.0      2020 06:05    TPro  8.4<H>  /  Alb  4.6  /  TBili  0.7  /  DBili  x   /  AST  27  /  ALT  33  /  AlkPhos  110      WBC Count: 12.15 K/uL <H> [3.8 - 10.5] ( @ 06:05)  Platelet Count - Automated: 340 K/uL [150 - 400] ( @ 06:05)  WBC Count: 14.32 K/uL <H> [3.8 - 10.5] ( @ 14:50)  Platelet Count - Automated: 379 K/uL [150 - 400] ( @ 14:50)    Urinalysis Basic - ( 2020 14:52 )    Color: YELLOW / Appearance: Lt TURBID / S.010 / pH: 7.5  Gluc: NEGATIVE / Ketone: SMALL  / Bili: NEGATIVE / Urobili: NORMAL   Blood: TRACE / Protein: 30 / Nitrite: NEGATIVE   Leuk Esterase: LARGE / RBC: 0-2 / WBC 30-50   Sq Epi: MODERATE / Non Sq Epi: x / Bacteria: MANY        *DENTAL RADIOGRAPHS: 1 pano previously taken, CT scan previously taken      ASSESSMENT: Intact penrose drain at site of I&D apical to teeth #10, #11. Patient states that she is feeling better with improvement of pain and swelling. WNL I&D healing.      PROCEDURE:  EOE, IOE, clinical examination, discussed findings with patient.    RECOMMENDATIONS:  1) Continue antibiotic and pain control per ED team  2) Dental to F/U today prior to discharge  3) Dental F/U with outpatient dentist for comprehensive dental care.   3) If any difficulty swallowing/breathing, fever occur, page dental.     Vladislav Cortez DDS, #[id number]  Vikash Hernandez DDS, #[id number]  Shahram Mckeon DMD, #[id number]    Resident Name, pager # Patient is a 30y old  Female who presents with a chief complaint of left side facial swelling    HPI:  Patient had I&D on 2020 following admission to the ED. CT ordered by ED prior to dental on call arrival.  Purulence was appreciated upon I&D. Culture was taken.   1) Dental disease, multiple regions in foci of periapical lucency surrounding   the teeth in the maxillary alveolus and mandible.     Left periapical 5 x 7 x 4 mm, AP, TR, CC lucency along left maxillary canine   #11 root, thinning buccal bony cortical margin, with subjacent 1.4 x 0.6 x   1.7 cm AP, TR, CC subperiosteal rim-enhancing collection along the left   maxillary alveolus concerning for subperiosteal abscess. Loss of adjacent   soft tissue planes with inflammatory change and stranding of the fat in the   left premaxillary soft tissues and buccal fat pad concerning for left   maxillary cellulitis extending to left premaxillary soft tissues, left   parotid Stensen's duct, left parotid and  spaces, left nasal ala   and left inferior orbital rim with left preseptal periorbital soft tissue   swelling, left platysma which demonstrates thickening and enlargement with   stranding the fat planes extending inferiorly into the left submandibular   region. The subcutaneous soft tissue swelling also extends into the left   premaxillary soft tissues to the left nasal ala and upper lip with loss of   normal intervening fat planes. Marked periapical lucencies noted along the   right maxillary incisor #8. There is dehiscence of the bony margin overlying   the right canine (series 3 image 110).     There is preseptal periorbital infectious and inflammatory change, there is   extension of the posterior molar roots to the floor of the left maxillary   antra with mucosal thickening in the left maxillary sinus, (2:136,   odontogenic sinusitis also not excluded.     Extensive dental disease along the mandible with multiple dental caries and   lucency. Orbital globes are intact. The mandibular condyles are located   without fracture. The temporomandibular joints are intact.     There is enlargement of the nasopharyngeal adenoidal soft tissues and   palatine tonsils likely reactive with multiple small lymph nodes in the   upper cervical chains. Intracranially, there is no acute hemorrhage or   midline shift.     IMPRESSION:     Dental disease with periapical lucency, in mandible and maxilla, including   left maxillary tooth #11 with a subjacent 1.4 x 0.6 x 1.7 cm AP, TR, CC left   subperiosteal abscess with left premaxillary soft tissue swelling and   cellulitis and left maxillary, parotid, submandibular, preseptal and   periorbital regions,, with additional areas of dental disease as detailed   above.      PAST MEDICAL & SURGICAL HISTORY:  No pertinent past medical history  No significant past surgical history      MEDICATIONS  (STANDING):  ampicillin/sulbactam  IVPB 3 Gram(s) IV Intermittent every 6 hours    MEDICATIONS  (PRN):  acetaminophen   Tablet .. 650 milliGRAM(s) Oral every 6 hours PRN Temp greater or equal to 38C (100.4F)  ketorolac   Injectable 30 milliGRAM(s) IV Push every 6 hours PRN mild to moderate pain      Allergies    No Known Allergies    Intolerances        FAMILY HISTORY:  No pertinent family history in first degree relatives      *SOCIAL HISTORY: (guardian or who pt came with), (smoking hx)    *Last Dental Visit:    Vital Signs Last 24 Hrs  T(C): 37.5 (2020 10:07), Max: 39.1 (2020 13:29)  T(F): 99.5 (2020 10:07), Max: 102.4 (2020 13:29)  HR: 103 (2020 10:07) (84 - 103)  BP: 116/76 (2020 10:07) (104/59 - 129/90)  BP(mean): --  RR: 16 (2020 10:07) (16 - 17)  SpO2: 100% (2020 10:07) (100% - 100%)    EOE:  TMJ ( - ) clicks                    ( -  ) pops                    (  - ) crepitus             Mandible FROM             Facial bones and MOM grossly intact             ( -  ) trismus             ( -  ) LAD             ( + ) swelling - improving since yesterday, UL facial swelling             ( +  ) asymmetry             (  - ) palpation             (  - ) SOB             (  - ) dysphagia             (  - ) LOC  lower left eyelid swelling is improving, patient able to open left eye, nasolobial fold swelling decreased/improving.    IOE:  secondary dentition, multiple grossly decayed teeth, multiple missing teeth           hard/soft palate:  ( -  ) palatal torus           tongue/FOM WNL           labial/buccal mucosa - edematous, healing mucosa at site of I&D           ( -  ) percussion           ( +  ) palpation - mild sensitivity upon palpation           (  + ) swelling - mild intraoral swelling associated with I&D site as expected      LABS:                        11.8   12.15 )-----------( 340      ( 2020 06:05 )             35.4         140  |  104  |  4<L>  ----------------------------<  78  3.4<L>   |  22  |  0.53    Ca    9.0      2020 06:05    TPro  8.4<H>  /  Alb  4.6  /  TBili  0.7  /  DBili  x   /  AST  27  /  ALT  33  /  AlkPhos  110      WBC Count: 12.15 K/uL <H> [3.8 - 10.5] ( @ 06:05)  Platelet Count - Automated: 340 K/uL [150 - 400] ( @ 06:05)  WBC Count: 14.32 K/uL <H> [3.8 - 10.5] ( @ 14:50)  Platelet Count - Automated: 379 K/uL [150 - 400] ( @ 14:50)    Urinalysis Basic - ( 2020 14:52 )    Color: YELLOW / Appearance: Lt TURBID / S.010 / pH: 7.5  Gluc: NEGATIVE / Ketone: SMALL  / Bili: NEGATIVE / Urobili: NORMAL   Blood: TRACE / Protein: 30 / Nitrite: NEGATIVE   Leuk Esterase: LARGE / RBC: 0-2 / WBC 30-50   Sq Epi: MODERATE / Non Sq Epi: x / Bacteria: MANY        *DENTAL RADIOGRAPHS: 1 pano previously taken, CT scan previously taken      ASSESSMENT: Intact penrose drain at site of I&D apical to teeth #10, #11. Patient states that she is feeling better with improvement of pain and swelling. WNL I&D healing.      PROCEDURE:  EOE, IOE, clinical examination, discussed findings with patient.    RECOMMENDATIONS:  1) Continue antibiotic and pain control per ED team  2) Dental to F/U today prior to discharge  3) Dental F/U with outpatient dentist for comprehensive dental care.   3) If any difficulty swallowing/breathing, fever occur, page dental.     Vladislav Cortez DDS, #[id number]  Vikash Hernandez DDS, #[id number]  Shahram Mckeon DMD, #[id number] Patient is a 30y old  Female who presents with a chief complaint of left side facial swelling    HPI:  Patient had I&D on 2020 following admission to the ED. CT ordered by ED prior to dental on call arrival.  Purulence was appreciated upon I&D. Culture was taken.   1) Dental disease, multiple regions in foci of periapical lucency surrounding   the teeth in the maxillary alveolus and mandible.     Left periapical 5 x 7 x 4 mm, AP, TR, CC lucency along left maxillary canine   #11 root, thinning buccal bony cortical margin, with subjacent 1.4 x 0.6 x   1.7 cm AP, TR, CC subperiosteal rim-enhancing collection along the left   maxillary alveolus concerning for subperiosteal abscess. Loss of adjacent   soft tissue planes with inflammatory change and stranding of the fat in the   left premaxillary soft tissues and buccal fat pad concerning for left   maxillary cellulitis extending to left premaxillary soft tissues, left   parotid Stensen's duct, left parotid and  spaces, left nasal ala   and left inferior orbital rim with left preseptal periorbital soft tissue   swelling, left platysma which demonstrates thickening and enlargement with   stranding the fat planes extending inferiorly into the left submandibular   region. The subcutaneous soft tissue swelling also extends into the left   premaxillary soft tissues to the left nasal ala and upper lip with loss of   normal intervening fat planes. Marked periapical lucencies noted along the   right maxillary incisor #8. There is dehiscence of the bony margin overlying   the right canine (series 3 image 110).     There is preseptal periorbital infectious and inflammatory change, there is   extension of the posterior molar roots to the floor of the left maxillary   antra with mucosal thickening in the left maxillary sinus, (2:136,   odontogenic sinusitis also not excluded.     Extensive dental disease along the mandible with multiple dental caries and   lucency. Orbital globes are intact. The mandibular condyles are located   without fracture. The temporomandibular joints are intact.     There is enlargement of the nasopharyngeal adenoidal soft tissues and   palatine tonsils likely reactive with multiple small lymph nodes in the   upper cervical chains. Intracranially, there is no acute hemorrhage or   midline shift.     IMPRESSION:     Dental disease with periapical lucency, in mandible and maxilla, including   left maxillary tooth #11 with a subjacent 1.4 x 0.6 x 1.7 cm AP, TR, CC left   subperiosteal abscess with left premaxillary soft tissue swelling and   cellulitis and left maxillary, parotid, submandibular, preseptal and   periorbital regions,, with additional areas of dental disease as detailed   above.      PAST MEDICAL & SURGICAL HISTORY:  No pertinent past medical history  No significant past surgical history      MEDICATIONS  (STANDING):  ampicillin/sulbactam  IVPB 3 Gram(s) IV Intermittent every 6 hours    MEDICATIONS  (PRN):  acetaminophen   Tablet .. 650 milliGRAM(s) Oral every 6 hours PRN Temp greater or equal to 38C (100.4F)  ketorolac   Injectable 30 milliGRAM(s) IV Push every 6 hours PRN mild to moderate pain      Allergies    No Known Allergies    Intolerances        FAMILY HISTORY:  No pertinent family history in first degree relatives      *SOCIAL HISTORY: (guardian or who pt came with), (smoking hx)    *Last Dental Visit:    Vital Signs Last 24 Hrs  T(C): 37.5 (2020 10:07), Max: 39.1 (2020 13:29)  T(F): 99.5 (2020 10:07), Max: 102.4 (2020 13:29)  HR: 103 (2020 10:07) (84 - 103)  BP: 116/76 (2020 10:07) (104/59 - 129/90)  BP(mean): --  RR: 16 (2020 10:07) (16 - 17)  SpO2: 100% (2020 10:07) (100% - 100%)    EOE:  TMJ ( - ) clicks                    ( -  ) pops                    (  - ) crepitus             Mandible FROM             Facial bones and MOM grossly intact             ( -  ) trismus             ( -  ) LAD             ( + ) swelling - improving since yesterday, UL facial swelling             ( +  ) asymmetry             (  - ) palpation             (  - ) SOB             (  - ) dysphagia             (  - ) LOC  lower left eyelid swelling is improving, patient able to open left eye, nasolobial fold swelling decreased/improving.   Maximum openings >35mm, reproducible.     IOE:  secondary dentition, multiple grossly decayed teeth, multiple missing teeth           hard/soft palate:  ( -  ) palatal torus           tongue/FOM WNL           labial/buccal mucosa - edematous, healing mucosa at site of I&D           ( -  ) percussion           ( +  ) palpation - mild sensitivity upon palpation           (  + ) swelling - mild intraoral swelling associated with I&D site as expected      LABS:                        11.8   12.15 )-----------( 340      ( 2020 06:05 )             35.4         140  |  104  |  4<L>  ----------------------------<  78  3.4<L>   |  22  |  0.53    Ca    9.0      2020 06:05    TPro  8.4<H>  /  Alb  4.6  /  TBili  0.7  /  DBili  x   /  AST  27  /  ALT  33  /  AlkPhos  110  07    WBC Count: 12.15 K/uL <H> [3.8 - 10.5] ( @ 06:05)  Platelet Count - Automated: 340 K/uL [150 - 400] ( @ 06:05)  WBC Count: 14.32 K/uL <H> [3.8 - 10.5] ( @ 14:50)  Platelet Count - Automated: 379 K/uL [150 - 400] ( @ 14:50)    Urinalysis Basic - ( 2020 14:52 )    Color: YELLOW / Appearance: Lt TURBID / S.010 / pH: 7.5  Gluc: NEGATIVE / Ketone: SMALL  / Bili: NEGATIVE / Urobili: NORMAL   Blood: TRACE / Protein: 30 / Nitrite: NEGATIVE   Leuk Esterase: LARGE / RBC: 0-2 / WBC 30-50   Sq Epi: MODERATE / Non Sq Epi: x / Bacteria: MANY        *DENTAL RADIOGRAPHS: 1 pano previously taken, CT scan previously taken      ASSESSMENT: Intact penrose drain at site of I&D apical to teeth #10, #11. Patient states that she is feeling better with improvement of pain and swelling. WNL I&D healing.      PROCEDURE:  EOE, IOE, clinical examination, discussed findings with patient.    RECOMMENDATIONS:  1) Continue antibiotic and pain control per ED team  2) Dental to F/U today prior to discharge  3) Dental F/U with outpatient dentist for comprehensive dental care.   4) If any difficulty swallowing/breathing, fever occur, page dental.     Vladislav Cortez DDS, #[id number]  Vikash Hernandez DDS, #[id number]  Shahram Mckeon DMD, #[id number]

## 2020-07-05 NOTE — ED CDU PROVIDER SUBSEQUENT DAY NOTE - ATTENDING CONTRIBUTION TO CARE
Patient seen with PA and agree with above note.  Manuel: 27 yof with left facial edema from dental abscess. In ED, dental service incised and drain placed. Today pt has resolution of pain. On exam, vitals stable, #10 drain noted with minimal edema, mild tn, +significant facial edema including up to periorbital area, significant trismus otherwise unremarkable exam. Pt re-evaluated by dental and recommend continued monitoring and IV antibxs. Pt then developed fever at 6pm. Will keep overnight and dental to see again in AM for drain removal vs extraction.

## 2020-07-05 NOTE — ED CDU PROVIDER SUBSEQUENT DAY NOTE - LEFT FACE
No facial edema noted to superior orbital rim w/ erythema and warmth to touch, most notable edema to left submandibular region, no crepitus, +mild trismus noted/SWELLING

## 2020-07-06 VITALS
DIASTOLIC BLOOD PRESSURE: 77 MMHG | HEART RATE: 84 BPM | OXYGEN SATURATION: 100 % | TEMPERATURE: 99 F | RESPIRATION RATE: 14 BRPM | SYSTOLIC BLOOD PRESSURE: 120 MMHG

## 2020-07-06 LAB
CULTURE RESULTS: SIGNIFICANT CHANGE UP
SPECIMEN SOURCE: SIGNIFICANT CHANGE UP

## 2020-07-06 PROCEDURE — 99217: CPT

## 2020-07-06 RX ADMIN — AMPICILLIN SODIUM AND SULBACTAM SODIUM 200 GRAM(S): 250; 125 INJECTION, POWDER, FOR SUSPENSION INTRAMUSCULAR; INTRAVENOUS at 03:08

## 2020-07-06 RX ADMIN — Medication 650 MILLIGRAM(S): at 01:52

## 2020-07-06 RX ADMIN — AMPICILLIN SODIUM AND SULBACTAM SODIUM 200 GRAM(S): 250; 125 INJECTION, POWDER, FOR SUSPENSION INTRAMUSCULAR; INTRAVENOUS at 09:16

## 2020-07-06 NOTE — ED CDU PROVIDER DISPOSITION NOTE - CLINICAL COURSE
29 y/o F presenting with dental abscess. Dental team was consulted; drainage of dental abscess was performed by team with Penrose drain placed.  Patient placed in CDU for continued IV Unasyn and pain control. The following day (6/5) pt spiked a fever, cultures sent, remained stable in CDU. Upon re-eval on 6/6 pt notes improvement of pain/fever, dental reassessed and removed drain. Pt was cleared for dc with dental f/u and continued abx.

## 2020-07-06 NOTE — ED CDU PROVIDER DISPOSITION NOTE - ATTENDING CONTRIBUTION TO CARE
31 yo F presenting with dental abscess. Dental team was consulted; drainage of dental abscess was performed by team with Penrose drain placed. Patient placed in CDU for continued IV Unasyn and pain control. The following day (6/5) pt spiked a fever, cultures sent, remained stable in CDU. Upon re-eval on 6/6 pt notes improvement of pain/fever. exa; NAD speaking full sentences comfortably,  poor dentition.   no malocclusion drooling trismus, s1s2 rrr cta bilateral abd soft nt. dental service reassessed and removed drain stable for dc dental f/u and continued abx.

## 2020-07-06 NOTE — PROGRESS NOTE ADULT - SUBJECTIVE AND OBJECTIVE BOX
Patient is a 30y old  Female who presents with a chief complaint of Pain and facial swelling (05 Jul 2020 16:43)    Dental team followed up to evaluate patient after incision and drainage procedure that had been completed on 7/4/2020. Patient had been monitored in the CDU and had received IV antibiotics following procedure. Please refer to previous progress notes for information about initial presentation, procedure notes and previous follow-up. At the current dental evaluation, the patient was awake, alert and responsive to verbal commands. Dental provider spoke to patient in patient's preferred language, Belgian. Patient stated that she felt notable improvement over the weekend, that her pain symptoms had improved and that she was able to see/open her eyes without difficulty. The patient denied difficulty breathing.    PAST MEDICAL & SURGICAL HISTORY:  No pertinent past medical history  No significant past surgical history    MEDICATIONS  (STANDING):  ampicillin/sulbactam  IVPB 3 Gram(s) IV Intermittent every 6 hours    MEDICATIONS  (PRN):  acetaminophen   Tablet .. 650 milliGRAM(s) Oral every 6 hours PRN Temp greater or equal to 38C (100.4F)  ketorolac   Injectable 30 milliGRAM(s) IV Push every 6 hours PRN mild to moderate pain      Allergies    No Known Allergies    Intolerances    FAMILY HISTORY:  No pertinent family history in first degree relatives    SOCIAL HISTORY: patient presented alone    Last Dental Visit: unknown    Vital Signs Last 24 Hrs  T(C): --  T(F): --  HR: --  BP: --  BP(mean): --  RR: --  SpO2: --    EOE:  TMJ ( - ) clicks                    (  - ) pops                    ( - ) crepitus             Mandible FROM             Facial bones and MOM grossly intact             ( -  ) trismus: patient reports improvement in opening             ( -  ) LAD             ( +  ) swelling: very mild facial swelling at the upper left side, localized to the #10/11 region. Nasiolabial fold can be visualized, no involvement of eyes at this time. Patient reports an improvement in swelling since last dental follow-up             ( +  ) asymmetry: mild facial swelling on upper left side             ( +  ) palpation: skin overlying the #10 and 11 region is mildly tender and painful upon palpation             ( -  ) SOB           Patient reports improvement in symptoms     IOE:  permanent dentition, multiple missing teeth, multiple carious teeth           hard/soft palate:  ( -  ) palatal torus           tongue/FOM WNL           labial/buccal mucosa- patient still has penrose drain in place at incision site at buccal vestibule, adjacent to #10 and 11           ( -  ) percussion           ( +  ) palpation: area of incision is painful upon palpation; retraction of upper lip is painful            ( +  ) swelling: mild post-operative swelling following incision and drainage    Dentition present: permanent dentition, multiple missing teeth, multiple carious teeth    Penrose drain still secured at incision site with silk suture. Mild bleeding noted at incision site following retraction of upper lip to visualize area. No further purulence appreciated. Site was hemostatic with gauze pressure    Radiographs: See previous progress notes for interpretation of radiographs    RADIOLOGY & ADDITIONAL STUDIES: See previous progress notes for information about CT head reading    ASSESSMENT: Patient is s/p incision and drainage and is healing at incision site within normal limits. Patient reports improvement in pain and swelling symptoms.    PROCEDURE:  Verbal consent given for limited bedside dental evaluation and removal of penrose drain and suture. Penrose drain and suture removed. No further purulence expressed in the area. Mucosa is raw and painful upon palpation, but the incision site is healing well. Patient was informed to follow-up with an outpatient dentist for definitive care, including extraction of root tips and grossly carious teeth associated with area of infection. Patient understands that she needs comprehensive care and extractions, including #11.    RECOMMENDATIONS:   1) Antibiotics and pain meds as per ED team  2) Dental F/U with outpatient dentist for comprehensive dental care. Patient can contact [facility] at [phone] for emergency treatment or extraction of teeth associated with infection.   3) If any difficulty swallowing/breathing, fever occur, page dental.     Tyler Cagle DDS #[id number]

## 2020-07-06 NOTE — ED CDU PROVIDER SUBSEQUENT DAY NOTE - MEDICAL DECISION MAKING DETAILS
31 y/o F presenting with dental abscess. Dental team was consulted; drainage of dental abscess was performed by team with Penrose drain placed.  Patient currently in CDU stay for continued IV Unasyn and pain control. patient with no acute complaints. plan for continue monitoring and dental reassessment in the AM.
26 y/o F no PMHx, Georgian speaking,  ID [id number] used, here w/ dental abscess sp drainage w/ subsequent left facial cellulitis. Plan for dental reeval tomorrow and continued abx.

## 2020-07-06 NOTE — ED CDU PROVIDER DISPOSITION NOTE - PATIENT PORTAL LINK FT
You can access the FollowMyHealth Patient Portal offered by [facility] by registering at the following website: [email or web address]. By joining [employer]’s FollowMyHealth portal, you will also be able to view your health information using other applications (apps) compatible with our system.

## 2020-07-06 NOTE — ED CDU PROVIDER SUBSEQUENT DAY NOTE - ATTENDING CONTRIBUTION TO CARE
31 yo F with dental abscess s/p drainage on abx. pain controlled. exa as above. stable for dc with out-patient dental follow up. 29 yo F presenting with dental abscess. Dental team was consulted; drainage of dental abscess was performed by team with Penrose drain placed. Patient placed in CDU for continued IV Unasyn and pain control. The following day (6/5) pt spiked a fever, cultures sent, remained stable in CDU. Upon re-eval on 6/6 pt notes improvement of pain/fever. exa; NAD speaking full sentences comfortably,  poor dentition.   no malocclusion drooling trismus, s1s2 rrr cta bilateral abd soft nt. dental service reassessed and removed drain stable for dc dental f/u and continued abx.

## 2020-07-06 NOTE — ED CDU PROVIDER SUBSEQUENT DAY NOTE - HISTORY
29 y/o F presenting with dental abscess. Dental team was consulted; drainage of dental abscess was performed by team with Penrose drain placed.  Patient currently in CDU stay for continued IV Unasyn and pain control. patient with no acute complaints. plan for continue monitoring and dental reassessment in the AM.
26 y/o F no PMHx, Slovak speaking,  ID [id number] used, here w/ dental abscess sp drainage w/ subsequent left facial cellulitis. Today pt is feeling better, denies any pain (now resolved) and states the swelling of her face has improved. Denies any visual changes. Seen by dental today who gave pt the option to be discharged home and return to clinic tomorrow for reassessment and drain removal, however due to fever of 100.4 and extent of facial edema, plan to keep pt in cdu for iv abx with dental eval in the am. Pt ok w/plan

## 2020-07-06 NOTE — ED CDU PROVIDER DISPOSITION NOTE - NSFOLLOWUPINSTRUCTIONS_ED_ALL_ED_FT
Follow up with your private dentist within 48-72 hrs or our clinic [phone].  Soft diet. Take Motrin 600mg every 8hrs with food for pain. Take Augmentin 875mg twice daily x 7 days.  Worsening pain, new facial swelling, fever, chills return to ER

## 2020-07-09 LAB
CULTURE RESULTS: SIGNIFICANT CHANGE UP
CULTURE RESULTS: SIGNIFICANT CHANGE UP
SPECIMEN SOURCE: SIGNIFICANT CHANGE UP
SPECIMEN SOURCE: SIGNIFICANT CHANGE UP

## 2020-10-23 NOTE — ED ADULT NURSE NOTE - ISOLATION TYPE:
HOSEA/CASEY Discharge Plan  Informed patient is ready for discharge. Patient’s discharge destination is Home/apartment with Services/Support. Patient to be picked up by family.  Patient/interested person has been counseled for post hospitalization care.  Patient agrees and understands goals and plan. Initial implementation of the patient’s discharge plan has been arranged, including any devices/equipment needed for discharge. Discharge plan communicated to MD, RN, CASEY STEVENSON and HOSEA.    Pt to return home with [facility] PT/OT, RN.  Orders entered by MD.  Home care liaison aware.    HOSEA notified Samreen with [facility] ([phone] F: [phone]).  Will fax DC summary when available.        None

## 2020-11-24 NOTE — ED PROVIDER NOTE - ATTENDING CONTRIBUTION TO CARE
[address] [facility] Patient Status:  Hospital Outpatient Surgery   Age/Gender 65year old female MRN [id number]   Location [facility]. Attending Latricia Feliz MD   Hosp Day # 0 PCP No primary care provider on file.        Enedelia concern for dental abscess. will start abx. ct for localization.  dental/omfs consult.  likely cdu

## 2024-05-31 NOTE — ED CDU PROVIDER INITIAL DAY NOTE - CROS ED CONS ALL NEG
Show Applicator Variable?: Yes negative... Render Post-Care Instructions In Note?: no Number Of Freeze-Thaw Cycles: 1 freeze-thaw cycle Spray Paint Text: The liquid nitrogen was applied to the skin utilizing a spray paint frosting technique. Medical Necessity Clause: This procedure was medically necessary because the lesions that were treated were: Consent: The patient's verbal or written consent was obtained including but not limited to risks of crusting, scabbing, blistering, scarring, darker or lighter pigmentary change, recurrence, incomplete removal and infection. Medical Necessity Information: It is in your best interest to select a reason for this procedure from the list below. All of these items fulfill various CMS LCD requirements except the new and changing color options. Detail Level: Detailed Post-Care Instructions: I reviewed with the patient in detail post-care instructions. Patient is to wear sunprotection, and avoid picking at any of the treated lesions. Pt may apply Vaseline to crusted or scabbing areas.